# Patient Record
Sex: MALE | Race: WHITE | Employment: FULL TIME | URBAN - METROPOLITAN AREA
[De-identification: names, ages, dates, MRNs, and addresses within clinical notes are randomized per-mention and may not be internally consistent; named-entity substitution may affect disease eponyms.]

---
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
## 2023-03-20 ENCOUNTER — HOSPITAL ENCOUNTER (EMERGENCY)
Facility: HOSPITAL | Age: 23
Discharge: HOME | End: 2023-03-20
Attending: EMERGENCY MEDICINE
Payer: MEDICAID

## 2023-03-20 VITALS
OXYGEN SATURATION: 100 % | DIASTOLIC BLOOD PRESSURE: 89 MMHG | TEMPERATURE: 99.1 F | HEART RATE: 104 BPM | SYSTOLIC BLOOD PRESSURE: 141 MMHG | BODY MASS INDEX: 29.84 KG/M2 | HEIGHT: 75 IN | RESPIRATION RATE: 18 BRPM | WEIGHT: 240 LBS

## 2023-03-20 DIAGNOSIS — F41.1 ANXIETY STATE: Primary | ICD-10-CM

## 2023-03-20 PROCEDURE — 99283 EMERGENCY DEPT VISIT LOW MDM: CPT

## 2023-03-20 PROCEDURE — 6370000000 HC RX 637 (ALT 250 FOR IP): Performed by: EMERGENCY MEDICINE

## 2023-03-20 PROCEDURE — 93005 ELECTROCARDIOGRAM TRACING: CPT | Performed by: EMERGENCY MEDICINE

## 2023-03-20 RX ORDER — HYDROXYZINE PAMOATE 25 MG/1
25 CAPSULE ORAL EVERY 12 HOURS PRN
Qty: 20 CAPSULE | Refills: 0 | Status: SHIPPED | OUTPATIENT
Start: 2023-03-20 | End: 2023-04-03

## 2023-03-20 RX ORDER — HYDROXYZINE PAMOATE 25 MG/1
25 CAPSULE ORAL ONCE
Status: COMPLETED | OUTPATIENT
Start: 2023-03-20 | End: 2023-03-20

## 2023-03-20 RX ADMIN — HYDROXYZINE PAMOATE 25 MG: 25 CAPSULE ORAL at 03:40

## 2023-03-20 ASSESSMENT — PAIN SCALES - GENERAL: PAINLEVEL_OUTOF10: 0

## 2023-03-20 ASSESSMENT — PAIN - FUNCTIONAL ASSESSMENT: PAIN_FUNCTIONAL_ASSESSMENT: NONE - DENIES PAIN

## 2023-03-20 NOTE — ED PROVIDER NOTES
NEUROLOGICAL: No gross facial drooping. Moves all 4 extremities spontaneously. PSYCHIATRIC: Mildly anxious although consolable. Diagnostics   Labs:  No results found for this visit on 03/20/23. Radiographs:  No results found. Procedures/EKG:   EKG: My review of the EKG revealed sinus tachycardia at 114 bpm with no acute ST segment elevation. Normal axis and intervals. No available comparison twelve-lead EKG at time of dictation. .      ED Course and MDM   In brief, Sarahy Young is a 26 y.o. male who presented to the emergency department with report of anxiety attack. The patient was given Vistaril as well as prescription for Vistaril as needed anxiety. The patient is to take home medications as prescribed by his primary care providers. He is to follow-up with a PCP as well as behavioral health provider. The patient is to return if any worsening or concerning symptoms. At the time of disposition he is stable, improved, and in no acute distress or obvious discomfort. The initial noted tachycardia and elevated blood pressure possibly was related to the anxiety episode. The vital signs improved after the patient was treated and stated he felt much better and calmer. ED Medication Orders (From admission, onward)      Start Ordered     Status Ordering Provider    03/20/23 0400 03/20/23 0338  hydrOXYzine pamoate (VISTARIL) capsule 25 mg  ONCE         Last MAR action: Given - by Olaf Pittman on 03/20/23 at 0 [facility], [address] [place] [address]            Final Impression      1.  Anxiety state      DISPOSITION Decision To Discharge 03/20/2023 04:22:36 AM     (Please note that portions of this note may have been completed with a voice recognition program. Efforts were made to edit the dictations but occasionally words are mis-transcribed.)    Ruben Aguayo MD  [address], [place]  03/20/23 2188

## 2023-03-20 NOTE — ED NOTES
I have reviewed discharge instructions with pt, pt verbalized understanding. Pt discharged from ED ambulatory with steady gait noted, stable in no distress.      Carina Tarango RN  03/20/23 2340

## 2023-03-20 NOTE — ED TRIAGE NOTES
Pt to ED with complaints of shortness of breath and possible panic attack that started 20-30 minutes ago, pt reports history of anxiety and is prescribed Zoloft daily but stopped taking it 2 months ago. States this is the first panic attack that he's had since stopping medication. Reports history of anxiety and depression, able to speak in full sentences, SPO2 100% RA.

## 2023-03-21 LAB
EKG ATRIAL RATE: 114 BPM
EKG DIAGNOSIS: NORMAL
EKG P AXIS: 51 DEGREES
EKG P-R INTERVAL: 196 MS
EKG Q-T INTERVAL: 296 MS
EKG QRS DURATION: 80 MS
EKG QTC CALCULATION (BAZETT): 407 MS
EKG R AXIS: 34 DEGREES
EKG T AXIS: 33 DEGREES
EKG VENTRICULAR RATE: 114 BPM

## 2023-03-21 PROCEDURE — 93010 ELECTROCARDIOGRAM REPORT: CPT | Performed by: INTERNAL MEDICINE
